# Patient Record
Sex: FEMALE | Race: WHITE | NOT HISPANIC OR LATINO | Employment: UNEMPLOYED | URBAN - METROPOLITAN AREA
[De-identification: names, ages, dates, MRNs, and addresses within clinical notes are randomized per-mention and may not be internally consistent; named-entity substitution may affect disease eponyms.]

---

## 2020-01-07 ENCOUNTER — OFFICE VISIT (OUTPATIENT)
Dept: OPHTHALMOLOGY | Facility: CLINIC | Age: 50
End: 2020-01-07
Payer: COMMERCIAL

## 2020-01-07 DIAGNOSIS — H52.13 MYOPIA OF BOTH EYES: ICD-10-CM

## 2020-01-07 DIAGNOSIS — H52.4 PRESBYOPIA OF BOTH EYES: ICD-10-CM

## 2020-01-07 DIAGNOSIS — Z01.00 EXAMINATION OF EYES AND VISION: Primary | ICD-10-CM

## 2020-01-07 DIAGNOSIS — Z98.890 HISTORY OF STRABISMUS SURGERY: ICD-10-CM

## 2020-01-07 DIAGNOSIS — H52.223 REGULAR ASTIGMATISM OF BOTH EYES: ICD-10-CM

## 2020-01-07 PROCEDURE — 92015 DETERMINE REFRACTIVE STATE: CPT | Performed by: STUDENT IN AN ORGANIZED HEALTH CARE EDUCATION/TRAINING PROGRAM

## 2020-01-07 PROCEDURE — 92004 COMPRE OPH EXAM NEW PT 1/>: CPT | Performed by: STUDENT IN AN ORGANIZED HEALTH CARE EDUCATION/TRAINING PROGRAM

## 2020-01-07 ASSESSMENT — VISUAL ACUITY
CORRECTION_TYPE: GLASSES
OS_CC: J2
OS_CC: 20/25+1
METHOD: SNELLEN - LINEAR
OD_CC: 20/25-2
OD_CC: J2

## 2020-01-07 ASSESSMENT — REFRACTION_WEARINGRX
OS_AXIS: 094
OS_ADD: +1.25
OD_SPHERE: -6.50
OD_ADD: +1.25
OS_CYLINDER: +2.50
SPECS_TYPE: PAL
OS_SPHERE: -7.00
OD_AXIS: 075
OD_CYLINDER: +5.00

## 2020-01-07 ASSESSMENT — CONF VISUAL FIELD
OS_NORMAL: 1
OD_NORMAL: 1

## 2020-01-07 ASSESSMENT — REFRACTION_MANIFEST
OS_ADD: +1.75
OS_SPHERE: -6.75
OD_SPHERE: -5.75
OD_ADD: +1.75
OS_CYLINDER: +2.00
OD_AXIS: 080
OS_AXIS: 097
OD_CYLINDER: +4.25

## 2020-01-07 ASSESSMENT — TONOMETRY
IOP_METHOD: APPLANATION
OD_IOP_MMHG: 16
OS_IOP_MMHG: 17

## 2020-01-07 ASSESSMENT — SLIT LAMP EXAM - LIDS
COMMENTS: NORMAL
COMMENTS: NORMAL

## 2020-01-07 ASSESSMENT — EXTERNAL EXAM - RIGHT EYE: OD_EXAM: NORMAL

## 2020-01-07 ASSESSMENT — EXTERNAL EXAM - LEFT EYE: OS_EXAM: NORMAL

## 2020-01-07 ASSESSMENT — CUP TO DISC RATIO
OD_RATIO: 0.25
OS_RATIO: 0.25

## 2020-01-07 NOTE — PROGRESS NOTES
Current Eye Medications:  no     Subjective:  Comprehensive eye exam.   Pt reports she is not seeing very well with her PAL, particularly for reading. She had strabismus surgery as a child, states she had an eye that turned inwards.     Objective:  See Ophthalmology Exam.       Assessment:  Jessica Sanches is a 49 year old female who presents with:   Encounter Diagnoses   Name Primary?     Examination of eyes and vision      Myopia of both eyes      Regular astigmatism of both eyes      Presbyopia of both eyes        History of strabismus surgery right eye        Plan:  Glasses prescription given    Marcela Orta MD  [phone]

## 2020-01-07 NOTE — LETTER
1/7/2020         RE: Jessica Sanches  [address] [place] [address]        Dear Colleague,    Thank you for referring your patient, Jessica Sanches, to the [facility]. Please see a copy of my visit note below.     Current Eye Medications:  no     Subjective:  Comprehensive eye exam.   Pt reports she is not seeing very well with her PAL, particularly for reading. She had strabismus surgery as a child, states she had an eye that turned inwards.     Objective:  See Ophthalmology Exam.       Assessment:  Jessica Sanches is a 49 year old female who presents with:   Encounter Diagnoses   Name Primary?     Examination of eyes and vision      Myopia of both eyes      Regular astigmatism of both eyes      Presbyopia of both eyes        History of strabismus surgery right eye        Plan:  Glasses prescription given    Marcela Orta MD  [phone]       Again, thank you for allowing me to participate in the care of your patient.        Sincerely,        Marcela Orta MD

## 2021-12-07 ENCOUNTER — OFFICE VISIT (OUTPATIENT)
Dept: OPHTHALMOLOGY | Facility: CLINIC | Age: 51
End: 2021-12-07
Payer: COMMERCIAL

## 2021-12-07 DIAGNOSIS — H52.223 MYOPIA OF BOTH EYES WITH REGULAR ASTIGMATISM AND PRESBYOPIA: ICD-10-CM

## 2021-12-07 DIAGNOSIS — H52.4 MYOPIA OF BOTH EYES WITH REGULAR ASTIGMATISM AND PRESBYOPIA: ICD-10-CM

## 2021-12-07 DIAGNOSIS — Z98.890 HISTORY OF STRABISMUS SURGERY: ICD-10-CM

## 2021-12-07 DIAGNOSIS — Z98.890 S/P LASIK SURGERY: ICD-10-CM

## 2021-12-07 DIAGNOSIS — H52.13 MYOPIA OF BOTH EYES WITH REGULAR ASTIGMATISM AND PRESBYOPIA: ICD-10-CM

## 2021-12-07 DIAGNOSIS — Z01.00 EXAMINATION OF EYES AND VISION: Primary | ICD-10-CM

## 2021-12-07 PROBLEM — E55.9 VITAMIN D DEFICIENCY: Status: ACTIVE | Noted: 2018-11-26

## 2021-12-07 PROBLEM — F32.2 CURRENT SEVERE EPISODE OF MAJOR DEPRESSIVE DISORDER WITHOUT PSYCHOTIC FEATURES WITHOUT PRIOR EPISODE (H): Status: ACTIVE | Noted: 2018-11-23

## 2021-12-07 PROBLEM — D64.9 ANEMIA OF UNKNOWN ETIOLOGY: Status: ACTIVE | Noted: 2018-11-23

## 2021-12-07 PROCEDURE — 92015 DETERMINE REFRACTIVE STATE: CPT | Performed by: STUDENT IN AN ORGANIZED HEALTH CARE EDUCATION/TRAINING PROGRAM

## 2021-12-07 PROCEDURE — 92014 COMPRE OPH EXAM EST PT 1/>: CPT | Performed by: STUDENT IN AN ORGANIZED HEALTH CARE EDUCATION/TRAINING PROGRAM

## 2021-12-07 ASSESSMENT — REFRACTION_MANIFEST
OS_ADD: +2.00
OD_AXIS: 155
OD_ADD: +2.00
OD_CYLINDER: +1.25
OD_SPHERE: -2.00
OS_CYLINDER: +0.75
OS_AXIS: 010
OS_SPHERE: -0.25

## 2021-12-07 ASSESSMENT — CUP TO DISC RATIO
OD_RATIO: 0.25
OS_RATIO: 0.25

## 2021-12-07 ASSESSMENT — VISUAL ACUITY
OD_PH_SC+: -1
OS_SC: 20/20
METHOD: SNELLEN - LINEAR
OD_PH_SC: 20/30
OD_SC: 20/50

## 2021-12-07 ASSESSMENT — TONOMETRY
OS_IOP_MMHG: 14
IOP_METHOD: APPLANATION
OD_IOP_MMHG: 14

## 2021-12-07 ASSESSMENT — EXTERNAL EXAM - RIGHT EYE: OD_EXAM: NORMAL

## 2021-12-07 ASSESSMENT — EXTERNAL EXAM - LEFT EYE: OS_EXAM: NORMAL

## 2021-12-07 ASSESSMENT — SLIT LAMP EXAM - LIDS
COMMENTS: NORMAL
COMMENTS: NORMAL

## 2021-12-07 ASSESSMENT — CONF VISUAL FIELD
OD_NORMAL: 1
OS_NORMAL: 1

## 2021-12-07 NOTE — PROGRESS NOTES
Current Eye Medications: none     Subjective:  Here for complete eye exam today. Had LASIK 7-2020 at the [facility], one eye close (right eye) and one eye for distance. Still having to wear cheaters, unsure of power.     Objective:  See Ophthalmology Exam.      Assessment:  Jessica Sanches is a 50 year old female who presents with:   Encounter Diagnoses   Name Primary?     Examination of eyes and vision      Myopia of both eyes with regular astigmatism and presbyopia        History of strabismus surgery right eye      S/P LASIK surgery - Both Eyes (monovision right eye near)      Stable eye exam.      Plan:  Continue over the counter readers as needed     Marcela Orta MD  [phone]

## 2021-12-07 NOTE — LETTER
12/7/2021         RE: Jessica Sanches  [address] [place] [address]        Dear Colleague,    Thank you for referring your patient, Jessica Sanches, [facility] Please see a copy of my visit note below.     Current Eye Medications: none     Subjective:  Here for complete eye exam today. Had LASIK 7-2020 at the [facility], one eye close (right eye) and one eye for distance. Still having to wear cheaters, unsure of power.     Objective:  See Ophthalmology Exam.      Assessment:  Jessica Sanches is a 50 year old female who presents with:   Encounter Diagnoses   Name Primary?     Examination of eyes and vision      Myopia of both eyes with regular astigmatism and presbyopia        History of strabismus surgery right eye      S/P LASIK surgery - Both Eyes (monovision right eye near)      Stable eye exam.      Plan:  Continue over the counter readers as needed     Marcela Orta MD  [phone]               Again, thank you for allowing me to participate in the care of your patient.        Sincerely,        Marcela Orta MD

## 2022-12-12 ENCOUNTER — OFFICE VISIT (OUTPATIENT)
Dept: OPHTHALMOLOGY | Facility: CLINIC | Age: 52
End: 2022-12-12
Payer: COMMERCIAL

## 2022-12-12 DIAGNOSIS — Z01.01 ENCOUNTER FOR EXAMINATION OF EYES AND VISION WITH ABNORMAL FINDINGS: ICD-10-CM

## 2022-12-12 DIAGNOSIS — Z98.890 HISTORY OF STRABISMUS SURGERY: Primary | ICD-10-CM

## 2022-12-12 DIAGNOSIS — H52.4 MYOPIA OF BOTH EYES WITH REGULAR ASTIGMATISM AND PRESBYOPIA: ICD-10-CM

## 2022-12-12 DIAGNOSIS — H52.223 MYOPIA OF BOTH EYES WITH REGULAR ASTIGMATISM AND PRESBYOPIA: ICD-10-CM

## 2022-12-12 DIAGNOSIS — Z98.890 S/P LASIK SURGERY: ICD-10-CM

## 2022-12-12 DIAGNOSIS — H52.13 MYOPIA OF BOTH EYES WITH REGULAR ASTIGMATISM AND PRESBYOPIA: ICD-10-CM

## 2022-12-12 PROCEDURE — 92015 DETERMINE REFRACTIVE STATE: CPT | Performed by: OPHTHALMOLOGY

## 2022-12-12 PROCEDURE — 92014 COMPRE OPH EXAM EST PT 1/>: CPT | Performed by: OPHTHALMOLOGY

## 2022-12-12 ASSESSMENT — SLIT LAMP EXAM - LIDS
COMMENTS: NORMAL
COMMENTS: NORMAL

## 2022-12-12 ASSESSMENT — VISUAL ACUITY
OD_PH_SC: 20/40
OD_PH_SC+: -1
OD_SC: J3
OS_SC: J16
OS_SC: 20/25
OD_SC+: +1
OD_SC: 20/60
METHOD: SNELLEN - LINEAR

## 2022-12-12 ASSESSMENT — CONF VISUAL FIELD
OD_INFERIOR_TEMPORAL_RESTRICTION: 0
OS_INFERIOR_TEMPORAL_RESTRICTION: 0
OS_SUPERIOR_NASAL_RESTRICTION: 0
OD_INFERIOR_NASAL_RESTRICTION: 0
OS_INFERIOR_NASAL_RESTRICTION: 0
OS_NORMAL: 1
OS_SUPERIOR_TEMPORAL_RESTRICTION: 0
OD_SUPERIOR_TEMPORAL_RESTRICTION: 0
OD_NORMAL: 1
OD_SUPERIOR_NASAL_RESTRICTION: 0

## 2022-12-12 ASSESSMENT — REFRACTION_MANIFEST
OD_SPHERE: -1.00
OS_CYLINDER: +1.25
OS_AXIS: 012
OD_CYLINDER: +0.50
OS_ADD: +2.25
OD_AXIS: 132
OS_SPHERE: +0.25
OD_ADD: +2.25

## 2022-12-12 ASSESSMENT — TONOMETRY
OS_IOP_MMHG: 16
IOP_METHOD: APPLANATION
OD_IOP_MMHG: 15

## 2022-12-12 ASSESSMENT — CUP TO DISC RATIO
OD_RATIO: 0.2
OS_RATIO: 0.3

## 2022-12-12 ASSESSMENT — EXTERNAL EXAM - LEFT EYE: OS_EXAM: NORMAL

## 2022-12-12 ASSESSMENT — EXTERNAL EXAM - RIGHT EYE: OD_EXAM: NORMAL

## 2022-12-12 NOTE — LETTER
"    12/12/2022         RE: Jessica Sanches  [address] [place] [address]        Dear Colleague,    [facility] you [facility], [facility]anches[facility] Please see a copy of my visit note below.     Current Eye Medications:  Artificial tears on a rare occasion.       Subjective:  Comprehensive Eye Exam.  No vision changes or concerns.  She had monovision Lasik ~2019.  Vision is adequate in each eye.  She wears over-the-counter readers when looking at the computer screen.     Objective:  See Ophthalmology Exam.       Assessment:  Stable eye exam.      ICD-10-CM    1. History of strabismus surgery right eye  Z98.890       2. S/P LASIK surgery - Both Eyes (monovision right eye near)  Z98.890       3. Encounter for examination of eyes and vision with abnormal findings  Z01.01       4. Myopia of both eyes with regular astigmatism and presbyopia  H52.13     H52.223     H52.4            Plan:  Glasses prescription given - optional  May use artificial tears up to four times a day (like Refresh Optive, Systane Balance, TheraTears, or generic artificial tears are ok. Avoid \"get the red out\" drops).   Call in August 2023 for an appointment in December 2023 for Complete Exam    Dr. Saucedo [phone]           Again, thank you for allowing me to participate in the care of your patient.        Sincerely,        Kwan Saucedo MD    "

## 2022-12-12 NOTE — PATIENT INSTRUCTIONS
"Glasses prescription given - optional  May use artificial tears up to four times a day (like Refresh Optive, Systane Balance, TheraTears, or generic artificial tears are ok. Avoid \"get the red out\" drops).   Call in August 2023 for an appointment in December 2023 for Complete Exam    Dr. Saucedo [phone]    "

## 2022-12-12 NOTE — PROGRESS NOTES
" Current Eye Medications:  Artificial tears on a rare occasion.       Subjective:  Comprehensive Eye Exam.  No vision changes or concerns.  She had monovision Lasik ~2019.  Vision is adequate in each eye.  She wears over-the-counter readers when looking at the computer screen.     Objective:  See Ophthalmology Exam.       Assessment:  Stable eye exam.      ICD-10-CM    1. History of strabismus surgery right eye  Z98.890       2. S/P LASIK surgery - Both Eyes (monovision right eye near)  Z98.890       3. Encounter for examination of eyes and vision with abnormal findings  Z01.01       4. Myopia of both eyes with regular astigmatism and presbyopia  H52.13     H52.223     H52.4            Plan:  Glasses prescription given - optional  May use artificial tears up to four times a day (like Refresh Optive, Systane Balance, TheraTears, or generic artificial tears are ok. Avoid \"get the red out\" drops).   Call in August 2023 for an appointment in December 2023 for Complete Exam    Dr. Saucedo [phone]       "

## 2022-12-18 PROBLEM — Z98.890 HISTORY OF STRABISMUS SURGERY: Status: ACTIVE | Noted: 2022-12-18

## 2022-12-18 PROBLEM — Z98.890 S/P LASIK SURGERY: Status: ACTIVE | Noted: 2022-12-18

## 2023-12-21 ENCOUNTER — OFFICE VISIT (OUTPATIENT)
Dept: OPHTHALMOLOGY | Facility: CLINIC | Age: 53
End: 2023-12-21
Payer: COMMERCIAL

## 2023-12-21 DIAGNOSIS — Z98.890 HISTORY OF STRABISMUS SURGERY: Primary | ICD-10-CM

## 2023-12-21 DIAGNOSIS — Z98.890 S/P LASIK SURGERY: ICD-10-CM

## 2023-12-21 DIAGNOSIS — H52.4 MYOPIA OF BOTH EYES WITH REGULAR ASTIGMATISM AND PRESBYOPIA: ICD-10-CM

## 2023-12-21 DIAGNOSIS — H52.223 MYOPIA OF BOTH EYES WITH REGULAR ASTIGMATISM AND PRESBYOPIA: ICD-10-CM

## 2023-12-21 DIAGNOSIS — Z01.01 ENCOUNTER FOR EXAMINATION OF EYES AND VISION WITH ABNORMAL FINDINGS: ICD-10-CM

## 2023-12-21 DIAGNOSIS — H52.13 MYOPIA OF BOTH EYES WITH REGULAR ASTIGMATISM AND PRESBYOPIA: ICD-10-CM

## 2023-12-21 PROCEDURE — 92015 DETERMINE REFRACTIVE STATE: CPT | Performed by: OPHTHALMOLOGY

## 2023-12-21 PROCEDURE — 92014 COMPRE OPH EXAM EST PT 1/>: CPT | Performed by: OPHTHALMOLOGY

## 2023-12-21 RX ORDER — VENLAFAXINE HYDROCHLORIDE 37.5 MG/1
37.5 CAPSULE, EXTENDED RELEASE ORAL DAILY
COMMUNITY
Start: 2023-07-24

## 2023-12-21 ASSESSMENT — REFRACTION_WEARINGRX
OD_CYLINDER: SPHERE
OD_ADD: +2.50
OD_SPHERE: +1.25
OD_SPHERE: PLANO
OS_CYLINDER: SPHERE
OD_CYLINDER: SPHERE
OS_SPHERE: +1.25
OS_CYLINDER: SPHERE
OS_SPHERE: PLANO
OS_ADD: +2.50

## 2023-12-21 ASSESSMENT — VISUAL ACUITY
OS_SC: 20/25
OD_SC: J1
OS_SC: J16
OD_PH_SC: 20/30
METHOD: SNELLEN - LINEAR
OD_SC: 20/50
OD_PH_SC+: +2
OS_SC+: +2

## 2023-12-21 ASSESSMENT — TONOMETRY
IOP_METHOD: APPLANATION
OD_IOP_MMHG: 16
OS_IOP_MMHG: 15

## 2023-12-21 ASSESSMENT — REFRACTION_MANIFEST
OS_ADD: +2.25
OS_CYLINDER: +1.25
OS_SPHERE: +0.25
OS_SPHERE: +1.50
OD_ADD: +2.25
OS_AXIS: 013
OS_ADD: +1.00
OD_AXIS: 160
OD_SPHERE: -1.00
OD_ADD: +1.00
OS_CYLINDER: +1.25
OS_AXIS: 013
OD_CYLINDER: +1.25
OD_CYLINDER: +1.25
OD_AXIS: 160
OD_SPHERE: -2.25

## 2023-12-21 ASSESSMENT — CONF VISUAL FIELD
OD_INFERIOR_NASAL_RESTRICTION: 0
OS_SUPERIOR_TEMPORAL_RESTRICTION: 0
OD_SUPERIOR_TEMPORAL_RESTRICTION: 0
OS_INFERIOR_NASAL_RESTRICTION: 0
OS_NORMAL: 1
OD_NORMAL: 1
OS_INFERIOR_TEMPORAL_RESTRICTION: 0
METHOD: COUNTING FINGERS
OD_SUPERIOR_NASAL_RESTRICTION: 0
OD_INFERIOR_TEMPORAL_RESTRICTION: 0
OS_SUPERIOR_NASAL_RESTRICTION: 0

## 2023-12-21 ASSESSMENT — SLIT LAMP EXAM - LIDS
COMMENTS: NORMAL
COMMENTS: NORMAL

## 2023-12-21 ASSESSMENT — EXTERNAL EXAM - RIGHT EYE: OD_EXAM: NORMAL

## 2023-12-21 ASSESSMENT — CUP TO DISC RATIO
OS_RATIO: 0.3
OD_RATIO: 0.2

## 2023-12-21 ASSESSMENT — EXTERNAL EXAM - LEFT EYE: OS_EXAM: NORMAL

## 2023-12-21 NOTE — LETTER
"    12/21/2023         RE: Jessica Sanches  [address] [place] [address]        Dear Colleague,    [facility] you [facility], Jessica Sanches[facility] Please see a copy of my visit note below.     Current Eye Medications:  none     Subjective:  comprehensive eye exam - noticing more trouble with using the computer, has OTC progressive gls (+2.50 add) but finds that her neck hurts from tilting head back. Got SVL OTC readers (+1.50), good for computer but does not like that she has to take them off and on to look at different ranges like walking or doing other activities. Hx Monovision LASIK. Distance vision is stable without correction.      Objective:  See Ophthalmology Exam.       Assessment:  Stable eye exam.      ICD-10-CM    1. History of strabismus surgery right eye  Z98.890       2. S/P LASIK surgery - Both Eyes (monovision right eye near)  Z98.890       3. Encounter for examination of eyes and vision with abnormal findings  Z01.01       4. Myopia of both eyes with regular astigmatism and presbyopia  H52.13     H52.223     H52.4            Plan:  Glasses prescription given - optional - recommend try progressive add and try to sit ergometrically so that downgaze more easily.    May use artificial tears up to four times a day (like Refresh Optive, Systane Balance, or TheraTears. Avoid \"get the red out\" drops and generic artifical tears).     Call in August 2024 for an appointment in December 2024 for Complete Exam    Dr. Saucedo [phone]           Again, thank you for allowing me to participate in the care of your patient.        Sincerely,        Kwan Saucedo MD  "

## 2023-12-21 NOTE — PATIENT INSTRUCTIONS
"Glasses prescription given - optional    May use artificial tears up to four times a day (like Refresh Optive, Systane Balance, or TheraTears. Avoid \"get the red out\" drops and generic artifical tears).     Call in August 2024 for an appointment in December 2024 for Complete Exam    Dr. Saucedo [phone]    "

## 2023-12-21 NOTE — PROGRESS NOTES
" Current Eye Medications:  none     Subjective:  comprehensive eye exam - noticing more trouble with using the computer, has OTC progressive gls (+2.50 add) but finds that her neck hurts from tilting head back. Got SVL OTC readers (+1.50), good for computer but does not like that she has to take them off and on to look at different ranges like walking or doing other activities. Hx Monovision LASIK. Distance vision is stable without correction.      Objective:  See Ophthalmology Exam.       Assessment:  Stable eye exam.      ICD-10-CM    1. History of strabismus surgery right eye  Z98.890       2. S/P LASIK surgery - Both Eyes (monovision right eye near)  Z98.890       3. Encounter for examination of eyes and vision with abnormal findings  Z01.01       4. Myopia of both eyes with regular astigmatism and presbyopia  H52.13     H52.223     H52.4            Plan:  Glasses prescription given - optional - recommend try progressive add and try to sit ergometrically so that downgaze more easily.    May use artificial tears up to four times a day (like Refresh Optive, Systane Balance, or TheraTears. Avoid \"get the red out\" drops and generic artifical tears).     Call in August 2024 for an appointment in December 2024 for Complete Exam    Dr. Saucedo [phone]       "

## 2024-01-21 ENCOUNTER — HEALTH MAINTENANCE LETTER (OUTPATIENT)
Age: 54
End: 2024-01-21

## 2024-07-08 ENCOUNTER — TRANSFERRED RECORDS (OUTPATIENT)
Dept: MULTI SPECIALTY CLINIC | Facility: CLINIC | Age: 54
End: 2024-07-08

## 2024-07-08 LAB — PAP SMEAR - HIM PATIENT REPORTED: NORMAL

## 2024-10-28 ENCOUNTER — OFFICE VISIT (OUTPATIENT)
Dept: ORTHOPEDICS | Facility: CLINIC | Age: 54
End: 2024-10-28
Payer: COMMERCIAL

## 2024-10-28 ENCOUNTER — ANCILLARY PROCEDURE (OUTPATIENT)
Dept: GENERAL RADIOLOGY | Facility: CLINIC | Age: 54
End: 2024-10-28
Attending: PEDIATRICS
Payer: COMMERCIAL

## 2024-10-28 DIAGNOSIS — M25.531 RIGHT WRIST PAIN: Primary | ICD-10-CM

## 2024-10-28 DIAGNOSIS — M25.531 RIGHT WRIST PAIN: ICD-10-CM

## 2024-10-28 DIAGNOSIS — M25.431 PAIN AND SWELLING OF RIGHT WRIST: ICD-10-CM

## 2024-10-28 DIAGNOSIS — M25.531 PAIN AND SWELLING OF RIGHT WRIST: ICD-10-CM

## 2024-10-28 DIAGNOSIS — M65.4 RADIAL STYLOID TENOSYNOVITIS: ICD-10-CM

## 2024-10-28 PROCEDURE — G2211 COMPLEX E/M VISIT ADD ON: HCPCS | Performed by: PEDIATRICS

## 2024-10-28 PROCEDURE — 73110 X-RAY EXAM OF WRIST: CPT | Mod: TC | Performed by: STUDENT IN AN ORGANIZED HEALTH CARE EDUCATION/TRAINING PROGRAM

## 2024-10-28 PROCEDURE — 99204 OFFICE O/P NEW MOD 45 MIN: CPT | Performed by: PEDIATRICS

## 2024-10-28 NOTE — PROGRESS NOTES
ASSESSMENT & PLAN    Jessica was seen today for pain.    Diagnoses and all orders for this visit:    Right wrist pain  -     XR Wrist Right G/E 3 Views; Future  -     Wrist/Arm/Hand Bracing Supplies Order Wrist Brace; Right; with thumb spica    Pain and swelling of right wrist  -     Wrist/Arm/Hand Bracing Supplies Order Wrist Brace; Right; with thumb spica    Radial styloid tenosynovitis      This issue is chronic and Unchanged.      ICD-10-CM    1. Right wrist pain  M25.531 XR Wrist Right G/E 3 Views     Wrist/Arm/Hand Bracing Supplies Order Wrist Brace; Right; with thumb spica      2. Pain and swelling of right wrist  M25.531 Wrist/Arm/Hand Bracing Supplies Order Wrist Brace; Right; with thumb spica    M25.431       3. Radial styloid tenosynovitis  M65.4           Differential included radial styloid tenosynovitis, possible ganglion cyst as well.    We discussed the following treatment options: symptom treatment, activity modification/rest, imaging, rehab and referral. Following discussion, plan: will start with bracing.    Plan:  - Today's Plan of Care:  Thumb spica bracing    Discussed activity considerations and other supportive care including Ice/Heat, OTC and other topical medications as needed.    -We also discussed other future treatment options:  Referral to Occupational therapy  US guided procedure (injection v. Ganglion cyst aspiration)  MRI if not improving    Follow Up: 3-6 weeks  Can continue to follow up with me for further treatment recommendations      Concerning signs and symptoms were reviewed and all questions were answered at this time.    Edith Bangura MD [facility]  Sports Medicine Physician  [facility]    -----  Chief Complaint   Patient presents with    Right Wrist - Pain       SUBJECTIVE  Jessica Sanches is a/an 53 year old female who is seen as a self referral for evaluation of right wrist.     The patient is seen by themselves.  The patient is Right handed    Onset: 10  month(s) ago. Reports insidious onset without acute precipitating event.  Location of Pain: right wrist; distal radius  Worsened by: grabbing, pulling, mousing, computer   Better with: icing, heating, voltaren   Treatments tried: ice, heat, casting/splinting/bracing, and compression sleeve and immobilize   Associated symptoms: swelling, weakness of right wrist, and bump noted     Orthopedic/Surgical history: NO  Social History/Occupation: working at a desk       REVIEW OF SYSTEMS:  Review of Systems    OBJECTIVE:  There were no vitals taken for this visit.   General: healthy, alert and in no distress  Skin: no suspicious lesions or rash.  CV: distal perfusion intact   Resp: normal respiratory effort without conversational dyspnea   Psych: normal mood and affect  Gait: NORMAL  Neuro: Normal light sensory exam of upper extremity    Right Wrist and Hand exam    Inspection:       Swelling: right radial wrist    Tender:       Radial styloid right wrist    Non Tender:       Remainder of the Wrist and Hand bilateral    ROM:       Full and symmetric active and passive range of motion of the forearm, wrist and digits bilateral    Strength:       5/5 strength in the muscles of the hand, wrist and forearm right    Special Tests:        positive (+) Finkelstein's maneuver right - mildly    Neurovascular:       2+ radial pulses bilaterally with brisk capillary refill and      normal sensation to light touch in the radial, median and ulnar nerve distributions     RADIOLOGY:  Final results and radiologist's interpretation, available in the [facility] health record.  Images were reviewed with the patient in the office today.  My personal interpretation of the performed imaging:    3 XR views of right wrist reviewed: no acute bony abnormality, no significant degenerative change  - will follow official read      Review of the result(s) of each unique test - XR

## 2024-10-28 NOTE — PATIENT INSTRUCTIONS
Differential included radial styloid tenosynovitis, possible ganglion cyst as well.    We discussed the following treatment options: symptom treatment, activity modification/rest, imaging, rehab and referral. Following discussion, plan: will start with bracing.    Plan:  - Today's Plan of Care:  Thumb spica bracing    Discussed activity considerations and other supportive care including Ice/Heat, OTC and other topical medications as needed.    -We also discussed other future treatment options:  Referral to Occupational therapy  US guided procedure (injection v. Ganglion cyst aspiration)  MRI if not improving    Follow Up: 3-6 weeks    If you have any further questions for your physician or physician s care team you can call [phone].

## 2024-12-09 ENCOUNTER — TRANSCRIBE ORDERS (OUTPATIENT)
Dept: OTHER | Age: 54
End: 2024-12-09

## 2024-12-09 DIAGNOSIS — R79.89 ABNORMAL THYROID BLOOD TEST: Primary | ICD-10-CM

## 2024-12-09 DIAGNOSIS — Z83.49 FAMILY HISTORY OF HASHIMOTO THYROIDITIS: ICD-10-CM

## 2024-12-19 ENCOUNTER — OFFICE VISIT (OUTPATIENT)
Dept: ORTHOPEDICS | Facility: CLINIC | Age: 54
End: 2024-12-19
Attending: PEDIATRICS
Payer: COMMERCIAL

## 2024-12-19 DIAGNOSIS — M65.4 RADIAL STYLOID TENOSYNOVITIS: Primary | ICD-10-CM

## 2024-12-19 DIAGNOSIS — M25.531 RIGHT WRIST PAIN: ICD-10-CM

## 2024-12-19 RX ORDER — BETAMETHASONE SODIUM PHOSPHATE AND BETAMETHASONE ACETATE 3; 3 MG/ML; MG/ML
6 INJECTION, SUSPENSION INTRA-ARTICULAR; INTRALESIONAL; INTRAMUSCULAR; SOFT TISSUE
Status: COMPLETED | OUTPATIENT
Start: 2024-12-19 | End: 2024-12-19

## 2024-12-19 RX ORDER — ROPIVACAINE HYDROCHLORIDE 5 MG/ML
1 INJECTION, SOLUTION EPIDURAL; INFILTRATION; PERINEURAL
Status: COMPLETED | OUTPATIENT
Start: 2024-12-19 | End: 2024-12-19

## 2024-12-19 RX ADMIN — BETAMETHASONE SODIUM PHOSPHATE AND BETAMETHASONE ACETATE 6 MG: 3; 3 INJECTION, SUSPENSION INTRA-ARTICULAR; INTRALESIONAL; INTRAMUSCULAR; SOFT TISSUE at 14:31

## 2024-12-19 RX ADMIN — ROPIVACAINE HYDROCHLORIDE 1 ML: 5 INJECTION, SOLUTION EPIDURAL; INFILTRATION; PERINEURAL at 14:31

## 2024-12-19 NOTE — PATIENT INSTRUCTIONS
[facility] Injection Discharge Instructions    Procedure: Right De Quervain's Tenosynovitis Steroid Injection    Follow-up: 1 mon as needed    You may shower, however avoid swimming, tub baths or hot tubs for 24 hours following your procedure  You may have a mild to moderate increase in pain for several days following the injection.  It may take up to 14 days for the steroid medication to start working although you may feel the effect as early as a few days after the procedure.  You may use ice packs for 10-15 minutes, 3 to 4 times a day at the injection site for comfort  You may use anti-inflammatory medications (such as Ibuprofen or Aleve or Advil) or Tylenol for pain control if necessary  If you were fasting, you may resume your normal diet and medications after the procedure  If you have diabetes, check your blood sugar more frequently than usual as your blood sugar may be higher than normal for 10-14 days following a steroid injection. Contact your doctor who manages your diabetes if your blood sugar is higher than usual    If you experience any of the following, call [facility] @ [phone] or [phone]  -Fever over 100 degree F  -Swelling, bleeding, redness, drainage, warmth at the injection site  - New or worsening pain

## 2024-12-19 NOTE — PROGRESS NOTES
Hand / Upper Extremity Injection/Arthrocentesis: R extensor compartment 1    Date/Time: 12/19/2024 2:31 PM    Performed by: Brendon Kincaid MD  Authorized by: Brendon Kincaid MD    Indications:  Pain and therapeutic  Needle Size:  25 G  Guidance: ultrasound    Approach:  Radial  Condition: de Quervain's      Site:  R extensor compartment 1  Medications:  6 mg betamethasone acet & sod phos 6 (3-3) MG/ML; 1 mL ROPivacaine 5 MG/ML  Outcome:  Tolerated well, no immediate complications  Procedure discussed: discussed risks, benefits, and alternatives    Consent Given by:  Patient  Timeout: timeout called immediately prior to procedure    Prep: patient was prepped and draped in usual sterile fashion     Ultrasound images of procedure were permanently stored.   Referred by Dr. Bangura     Patient reported some improvement of pain after the numbing portion right 1st dorsal compartment steroid injection.  Ultrasound guided images were permanently stored.  Aftercare instructions given to patient.  Plan to follow-up as previously discussed with referring provider.     Brendon Kincaid MD [facility]      I was present with the resident during the history and exam.  I discussed the case with the resident and agree with the findings as documented in the assessment and plan.

## 2024-12-19 NOTE — LETTER
12/19/2024      Jessica Sanches  [address] [place] [address]      Dear Colleague,    Thank you for referring your patient, Jessica Snaches, to the [facility] [place]. Please see a copy of my visit note below.    Hand / Upper Extremity Injection/Arthrocentesis: R extensor compartment 1    Date/Time: 12/19/2024 2:31 PM    Performed by: Brendon Kincaid MD  Authorized by: Brendon Kincaid MD    Indications:  Pain and therapeutic  Needle Size:  25 G  Guidance: ultrasound    Approach:  Radial  Condition: de Quervain's      Site:  R extensor compartment 1  Medications:  6 mg betamethasone acet & sod phos 6 (3-3) MG/ML; 1 mL ROPivacaine 5 MG/ML  Outcome:  Tolerated well, no immediate complications  Procedure discussed: discussed risks, benefits, and alternatives    Consent Given by:  Patient  Timeout: timeout called immediately prior to procedure    Prep: patient was prepped and draped in usual sterile fashion     Ultrasound images of procedure were permanently stored.   Referred by Dr. Bangura     Patient reported some improvement of pain after the numbing portion right 1st dorsal compartment steroid injection.  Ultrasound guided images were permanently stored.  Aftercare instructions given to patient.  Plan to follow-up as previously discussed with referring provider.     Brendon Kincaid MD [facility]            Again, thank you for allowing me to participate in the care of your patient.        Sincerely,        Brendon Kincaid MD

## 2025-02-01 ENCOUNTER — HEALTH MAINTENANCE LETTER (OUTPATIENT)
Age: 55
End: 2025-02-01

## 2025-04-30 ENCOUNTER — HOSPITAL ENCOUNTER (OUTPATIENT)
Dept: MRI IMAGING | Facility: CLINIC | Age: 55
Discharge: HOME | End: 2025-04-30
Attending: PEDIATRICS
Payer: COMMERCIAL

## 2025-04-30 DIAGNOSIS — M65.4 RADIAL STYLOID TENOSYNOVITIS: ICD-10-CM

## 2025-04-30 PROCEDURE — 73221 MRI JOINT UPR EXTREM W/O DYE: CPT | Mod: RT

## 2025-04-30 PROCEDURE — 73221 MRI JOINT UPR EXTREM W/O DYE: CPT | Mod: 26 | Performed by: RADIOLOGY

## 2025-05-05 ENCOUNTER — PATIENT OUTREACH (OUTPATIENT)
Dept: CARE COORDINATION | Facility: CLINIC | Age: 55
End: 2025-05-05
Payer: COMMERCIAL

## 2025-05-07 ENCOUNTER — PATIENT OUTREACH (OUTPATIENT)
Dept: CARE COORDINATION | Facility: CLINIC | Age: 55
End: 2025-05-07
Payer: COMMERCIAL

## 2025-05-09 PROBLEM — M25.531 RIGHT WRIST PAIN: Status: ACTIVE | Noted: 2025-05-09

## 2025-05-09 PROBLEM — M65.4 RADIAL STYLOID TENOSYNOVITIS: Status: ACTIVE | Noted: 2025-05-09

## 2025-05-12 NOTE — TELEPHONE ENCOUNTER
Action May 12, 2025 11:01 AM MT   Action Taken Sent a request for imaging from [facility].     DIAGNOSIS:   Radial styloid tenosynovitis [M65.4]  - Primary  Right wrist pain [M25.531]   APPOINTMENT DATE: 05/20/2025   NOTES STATUS DETAILS   OFFICE NOTE from referring provider Internal 05/02/2025 - Edith Bangura MD  Sports Medicine   OFFICE NOTE from other specialist Internal 05/09/2025 - [facility]   MRI PACS Internal   ULTRASOUND IN PROCESS [facility]:  12/13/2024 - RT Axillary     XRAYS (IMAGES & REPORTS) PACS Internal

## 2025-05-21 ENCOUNTER — OFFICE VISIT (OUTPATIENT)
Dept: ORTHOPEDICS | Facility: CLINIC | Age: 55
End: 2025-05-21
Payer: COMMERCIAL

## 2025-05-21 ENCOUNTER — PRE VISIT (OUTPATIENT)
Dept: ORTHOPEDICS | Facility: CLINIC | Age: 55
End: 2025-05-21

## 2025-05-21 DIAGNOSIS — M25.531 PAIN AND SWELLING OF RIGHT WRIST: ICD-10-CM

## 2025-05-21 DIAGNOSIS — M65.4 RADIAL STYLOID TENOSYNOVITIS: ICD-10-CM

## 2025-05-21 DIAGNOSIS — M25.431 PAIN AND SWELLING OF RIGHT WRIST: ICD-10-CM

## 2025-05-21 DIAGNOSIS — M25.531 RIGHT WRIST PAIN: ICD-10-CM

## 2025-05-21 PROCEDURE — 99203 OFFICE O/P NEW LOW 30 MIN: CPT | Mod: GC | Performed by: ORTHOPAEDIC SURGERY

## 2025-05-21 NOTE — LETTER
5/21/2025      Jessica Sanches  [address] [place] [address]      Dear Colleague,    Thank you for referring your patient, Jessica Sanches, to the [facility] [place]. Please see a copy of my visit note below.    Orthopaedic Surgery Consultation  5/21/2025 9:07 AM   by Yo Bateman MD    Jessica Sanches MRN# [id number]   Age: 54 year old YOB: 1970     Reason for consult: Right DeQuervain's Tenosynovitis                       Assessment and Plan:   Assessment:   Jessica is a 55yo RHD Female with right 1st extensor compartment tenosynovitis.       Plan:   We discussed radiographic and clinical findings. It seems that Jessica is making progress with hand therapy.  She had decent improvement with corticosteroid injection.  She would like to continue working with hand therapy at this time.  We did discuss surgical release and decompression of the right first extensor compartment.  Would recommend that she follows up in 5 to 6 weeks to reassess her symptoms.  If she is doing well with hand therapy, she can cancel the appointment.  However, if she continues to have symptoms, would be reasonable to proceed with surgical management at that time.  She understands and agrees with the plan.              History of Present Illness:   54 year old female right-hand-dominant presents for evaluation of right wrist pain.  She was diagnosed with right de Quervain's tenosynovitis and was given a splint.  She reports that the splint does help with her symptoms.  She also received a corticosteroid injection in December 2021 which provided her 2 months of relief.  She noticed the bump over the radial styloid region.  She continues to have some symptoms with prolonged and extensive activities.  She denies any numbness or tingling.  Denies pain anywhere else.  Denies any other concerns.           Past Medical History:     Patient Active Problem List   Diagnosis     Vitamin D  deficiency     Insomnia, unspecified     Disorder of thyroid     Current severe episode of major depressive disorder without psychotic features without prior episode (H)     Anemia of unknown etiology     History of strabismus surgery right eye     S/P LASIK surgery - Both Eyes (monovision right eye near)     Radial styloid tenosynovitis     Right wrist pain     Past Medical History:   Diagnosis Date     Amblyopia      Strabismus             Past Surgical History:   Relevant surgical history as mentioned in HPI.  Past Surgical History:   Procedure Laterality Date     LASIK       STRABISMUS SURGERY              Social History:     Social History     Socioeconomic History     Marital status:      Spouse name: Not on file     Number of children: Not on file     Years of education: Not on file     Highest education level: Not on file   Occupational History     Not on file   Tobacco Use     Smoking status: Never     Smokeless tobacco: Never   Substance and Sexual Activity     Alcohol use: Not on file     Drug use: Not on file     Sexual activity: Not on file   Other Topics Concern     Not on file   Social History Narrative     Not on file     Social Drivers of Health     Financial Resource Strain: Low Risk  (6/13/2023)    Received from [facility]    Financial Resource Strain      Difficulty of Paying Living Expenses: 3      Difficulty of Paying Living Expenses: Not on file   Food Insecurity: No Food Insecurity (6/13/2023)    Received from [facility]    Food Insecurity      Worried About Running Out of Food in the Last Year: 1   Transportation Needs: No Transportation Needs (6/13/2023)    Received from [facility]    Transportation Needs      Lack of Transportation (Medical): 1   Physical Activity: Not on file   Stress: Not on file   Social Connections: Unknown (6/17/2024)    Received from [facility]    Social Connections      Frequency of Communication with Friends and Family: Not on file   Interpersonal Safety: Not on file   Housing Stability: Low Risk  (6/13/2023)    Received from [facility]    Housing Stability      Unable to Pay for Housing in the Last Year: 1     Smoking, EtOH,        Family History:     Family History   Problem Relation Age of Onset     Hypertension Mother      Cancer Mother         Breast     Hypertension Father      Diabetes No family hx of      Glaucoma No family hx of      Strabismus No family hx of      Eye Surgery No family hx of      No history of problems with bleeding or anesthesia       Allergies:   No Known Allergies       Medications:     Current Outpatient Medications   Medication Sig Dispense Refill     venlafaxine (EFFEXOR XR) 37.5 MG 24 hr capsule Take 37.5 mg by mouth daily       No current facility-administered medications for this visit.             Review of Systems:   A comprehensive 10 point review of systems (constitutional, ENT, cardiac, peripheral vascular, lymphatic, respiratory, GI, , Musculoskeletal, skin, Neurological) was performed and found to be negative except as described in this note.           Physical Exam:     EXAMINATION pertinent findings:   VITAL SIGNS: There were no vitals taken for this visit.  There is no height or weight on file to calculate BMI.  RESP: non labored breathing   CARD: comfortable, no acute distress  ABD: benign   Right hand exam  - There is calcification and inflammation over the right first extensor compartment  - Some pain with thumb extension.  Minimal pain with Finkelstein test.  - Tenderness palpation over the right snuffbox and radial styloid region  - No motor or sensory deficits noted.  - Radial pulse palpable  - Fingers warm and well perfused  - Brisk capillary refill              Data:     All laboratory data reviewed  All imaging studies reviewed by  me.  Pertinent Imaging and Lab Review:   Reviewed right wrist x-rays obtained on 10/28/2024 and right wrist MRI obtained on 4/30/2025.  There is evidence of inflammation and tenosynovitis of the first extensor compartment mainly located over the radial styloid region.  No other acute osseous abnormalities.    --  Rufus Calixto MD  Orthopedic Surgery PGY-4      Total Time = 20 min, 50% of which was spent in counseling and coordination of care as documented above.    Yo Bateman MD  Orthopedic Surgery, Upper Extremity  Cell [phone]         Attestation signed by Yo Bateman MD at 5/21/2025  4:07 PM:  Patient was seen and examined with the resident.  I agree with the assessment and plan of care.      Again, thank you for allowing me to participate in the care of your patient.        Sincerely,        Yo Bateman MD    Electronically signed

## 2025-05-21 NOTE — PROGRESS NOTES
Orthopaedic Surgery Consultation  5/21/2025 9:07 AM   by Yo Bateman MD    Jessica Sanches MRN# [id number]   Age: 54 year old YOB: 1970     Reason for consult: Right DeQuervain's Tenosynovitis                       Assessment and Plan:   Assessment:   Jessica is a 55yo RHD Female with right 1st extensor compartment tenosynovitis.       Plan:   We discussed radiographic and clinical findings. It seems that Jessica is making progress with hand therapy.  She had decent improvement with corticosteroid injection.  She would like to continue working with hand therapy at this time.  We did discuss surgical release and decompression of the right first extensor compartment.  Would recommend that she follows up in 5 to 6 weeks to reassess her symptoms.  If she is doing well with hand therapy, she can cancel the appointment.  However, if she continues to have symptoms, would be reasonable to proceed with surgical management at that time.  She understands and agrees with the plan.              History of Present Illness:   54 year old female right-hand-dominant presents for evaluation of right wrist pain.  She was diagnosed with right de Quervain's tenosynovitis and was given a splint.  She reports that the splint does help with her symptoms.  She also received a corticosteroid injection in December 2021 which provided her 2 months of relief.  She noticed the bump over the radial styloid region.  She continues to have some symptoms with prolonged and extensive activities.  She denies any numbness or tingling.  Denies pain anywhere else.  Denies any other concerns.           Past Medical History:     Patient Active Problem List   Diagnosis    Vitamin D deficiency    Insomnia, unspecified    Disorder of thyroid    Current severe episode of major depressive disorder without psychotic features without prior episode (H)    Anemia of unknown etiology    History of strabismus surgery right eye    S/P LASIK surgery  - Both Eyes (monovision right eye near)    Radial styloid tenosynovitis    Right wrist pain     Past Medical History:   Diagnosis Date    Amblyopia     Strabismus             Past Surgical History:   Relevant surgical history as mentioned in HPI.  Past Surgical History:   Procedure Laterality Date    LASIK      STRABISMUS SURGERY              Social History:     Social History     Socioeconomic History    Marital status:      Spouse name: Not on file    Number of children: Not on file    Years of education: Not on file    Highest education level: Not on file   Occupational History    Not on file   Tobacco Use    Smoking status: Never    Smokeless tobacco: Never   Substance and Sexual Activity    Alcohol use: Not on file    Drug use: Not on file    Sexual activity: Not on file   Other Topics Concern    Not on file   Social History Narrative    Not on file     Social Drivers of Health     Financial Resource Strain: Low Risk  (6/13/2023)    Received from [facility]    Financial Resource Strain     Difficulty of Paying Living Expenses: 3     Difficulty of Paying Living Expenses: Not on file   Food Insecurity: No Food Insecurity (6/13/2023)    Received from [facility]    Food Insecurity     Worried About Running Out of Food in the Last Year: 1   Transportation Needs: No Transportation Needs (6/13/2023)    Received from [facility]    Transportation Needs     Lack of Transportation (Medical): 1   Physical Activity: Not on file   Stress: Not on file   Social Connections: Unknown (6/17/2024)    Received from [facility]    Social Connections     Frequency of Communication with Friends and Family: Not on file   Interpersonal Safety: Not on file   Housing Stability: Low Risk  (6/13/2023)    Received from [facility]    Housing Stability     Unable to Pay  for Housing in the Last Year: 1     Smoking, EtOH,        Family History:     Family History   Problem Relation Age of Onset    Hypertension Mother     Cancer Mother         Breast    Hypertension Father     Diabetes No family hx of     Glaucoma No family hx of     Strabismus No family hx of     Eye Surgery No family hx of      No history of problems with bleeding or anesthesia       Allergies:   No Known Allergies       Medications:     Current Outpatient Medications   Medication Sig Dispense Refill    venlafaxine (EFFEXOR XR) 37.5 MG 24 hr capsule Take 37.5 mg by mouth daily       No current facility-administered medications for this visit.             Review of Systems:   A comprehensive 10 point review of systems (constitutional, ENT, cardiac, peripheral vascular, lymphatic, respiratory, GI, , Musculoskeletal, skin, Neurological) was performed and found to be negative except as described in this note.           Physical Exam:     EXAMINATION pertinent findings:   VITAL SIGNS: There were no vitals taken for this visit.  There is no height or weight on file to calculate BMI.  RESP: non labored breathing   CARD: comfortable, no acute distress  ABD: benign   Right hand exam  - There is calcification and inflammation over the right first extensor compartment  - Some pain with thumb extension.  Minimal pain with Finkelstein test.  - Tenderness palpation over the right snuffbox and radial styloid region  - No motor or sensory deficits noted.  - Radial pulse palpable  - Fingers warm and well perfused  - Brisk capillary refill              Data:     All laboratory data reviewed  All imaging studies reviewed by me.  Pertinent Imaging and Lab Review:   Reviewed right wrist x-rays obtained on 10/28/2024 and right wrist MRI obtained on 4/30/2025.  There is evidence of inflammation and tenosynovitis of the first extensor compartment mainly located over the radial styloid region.  No other acute osseous  abnormalities.    --  Rufus Calixto MD  Orthopedic Surgery PGY-4      Total Time = 20 min, 50% of which was spent in counseling and coordination of care as documented above.    Yo Bateman MD  Orthopedic Surgery, Upper Extremity  Cell [phone]

## 2025-05-21 NOTE — NURSING NOTE
Reason For Visit:   Chief Complaint   Patient presents with    Consult     Right wrist tenosynovitis        Primary MD: Kymberly Eastman  Ref. MD: Edith Bangura    Age: 54 year old    ?  No      There were no vitals taken for this visit.      Pain Assessment  Patient Currently in Pain: Yes  Patient's Stated Pain Goal: 2 (2/10 with use)  Primary Pain Location: Wrist (Right)  Pain Descriptors: Intermittent, Aching    Hand Dominance Evaluation  Hand Dominance: Right          QuickDASH Assessment      5/20/2025     6:59 PM   QuickDASH Main   1. Open a tight or new jar Moderate difficulty   2. Do heavy household chores (e.g., wash walls, floors) Moderate difficulty   3. Carry a shopping bag or briefcase Moderate difficulty   4. Wash your back Moderate difficulty   5. Use a knife to cut food Mild difficulty   6. Recreational activities in which you take some force or impact through your arm, shoulder or hand (e.g., golf, hammering, tennis, etc.) Mild difficulty   7. During the past week, to what extent has your arm, shoulder or hand problem interfered with your normal social activities with family, friends, neighbours or groups Moderately   8. During the past week, were you limited in your work or other regular daily activities as a result of your arm, shoulder or hand problem Moderately limited   9. Arm, shoulder or hand pain Mild   10.Tingling (pins and needles) in your arm,shoulder or hand None   11. During the past week, how much difficulty have you had sleeping because of the pain in your arm, shoulder or hand Mild difficulty   Quickdash Ability Score 36.36          Current Outpatient Medications   Medication Sig Dispense Refill    venlafaxine (EFFEXOR XR) 37.5 MG 24 hr capsule Take 37.5 mg by mouth daily         No Known Allergies    CHELSI CHING, ATC

## 2025-06-11 ENCOUNTER — THERAPY VISIT (OUTPATIENT)
Dept: OCCUPATIONAL THERAPY | Facility: CLINIC | Age: 55
End: 2025-06-11
Payer: COMMERCIAL

## 2025-06-11 DIAGNOSIS — M25.531 RIGHT WRIST PAIN: Primary | ICD-10-CM

## 2025-06-11 PROCEDURE — 97140 MANUAL THERAPY 1/> REGIONS: CPT | Mod: GO

## 2025-06-11 PROCEDURE — 97110 THERAPEUTIC EXERCISES: CPT | Mod: GO

## 2025-06-11 PROCEDURE — 97035 APP MDLTY 1+ULTRASOUND EA 15: CPT | Mod: GO

## 2025-06-26 ENCOUNTER — THERAPY VISIT (OUTPATIENT)
Dept: OCCUPATIONAL THERAPY | Facility: CLINIC | Age: 55
End: 2025-06-26
Payer: COMMERCIAL

## 2025-06-26 DIAGNOSIS — M25.531 RIGHT WRIST PAIN: Primary | ICD-10-CM

## 2025-06-30 ENCOUNTER — OFFICE VISIT (OUTPATIENT)
Dept: ORTHOPEDICS | Facility: CLINIC | Age: 55
End: 2025-06-30
Payer: COMMERCIAL

## 2025-06-30 DIAGNOSIS — M65.4 DE QUERVAIN'S TENOSYNOVITIS, RIGHT: Primary | ICD-10-CM

## 2025-06-30 PROCEDURE — 99214 OFFICE O/P EST MOD 30 MIN: CPT | Mod: GC | Performed by: STUDENT IN AN ORGANIZED HEALTH CARE EDUCATION/TRAINING PROGRAM

## 2025-06-30 NOTE — NURSING NOTE
Teaching Flowsheet     Visit Type: In Clinic    Time Start: 0856  Time End: 0906  Total Time Spent: 10 min.    Surgeon: Dr Magen Styles  Location of Surgery (known or anticipated): [facility]   Type of Anesthesia: Local  Worker's Compensation Procedure: No    Pertinent Medical History: Depression  Were medical conditions reviewed and appropriate for location? Yes  BMI: Normal BMI (22.86)    Relevant Diagnosis: DeQuervain's tenosynovitis on right  Teaching Topic: Right DeQuervain's release    Person(s) involved in teaching:   Patient  : No.   Verified Patient's Phone Number: NO    Caregiver//  Name: N/A, local anesthetic only    Motivation Level:  Asks Questions: Yes  Eager to Learn: Yes  Cooperative: Yes  Receptive (willing/able to accept information): Yes  Any cultural factors/Moravian beliefs that may influence understanding or compliance? No     Patient demonstrates understanding of the following:  Reason for the appointment, diagnosis and treatment plan: Yes  Knowledge of proper use of medications and conditions for which they are ordered (with special attention to potential side effects or drug interactions): Yes  Which situations necessitate calling provider and whom to contact: Yes     Teaching Concerns Addressed:   Proper use and care of medical equip, care aids, etc.: Yes  Nutritional needs and diet plan: Yes  Pain management techniques: Yes  Wound Care: Yes  How and/when to access community resources: Yes  Need for pre-op with in 30 days: N/A     Does patient have difficulty getting a ride to appointments (post-ops, PT/OT): No  Patient's plan after discharge: home with family or spouse     Instructional Materials Used/Given: Preoperative surgery packet, 2 bottles of antibacterial Chlorhexidine soap. Stop Light Tool reviewed, after-hours number provided, patient verbalized understanding, had no immediate questions.    - Important Contact Info/Phone Numbers: emphasizing clinic number  [phone] and after hours number [phone]  - Map/location of surgery and follow-up appointments  - Showering instructions  - Stoplight Tool     -Next step: schedule a surgery date.    Jeannine Ashley RN

## 2025-06-30 NOTE — LETTER
6/30/2025      Jessica Sanches  [address] [place] [address]      Dear Colleague,    Thank you for referring your patient, Jessica Sanches, to the [facility] [place]. Please see a copy of my visit note below.    Orthopaedic Surgery Hand and Upper Extremity Clinic H&P Note:  Date: Jun 30, 2025     Patient Name: Jessica Sanches  MRN: [id number]    Consult requested by: Referred by Dr Bateman for ongoing care    CHIEF COMPLAINT: Right De Quervain's Tenosynovitis    Dominant Hand: Right  Occupation: Marketing      HPI:  Ms. Jessica Sanches is a 54 year old  right hand dominant female who presents in follow up for right De Quervain's tenosynovitis. The patient was last seen by Dr. Bateman on 5/21/2025 at which point discussion was had to continue with hand therapy with return to clinic in 5 to 6 weeks should she still have persistent symptoms and desire operative discussion.     On examination today, the patient states that she has continued with hand therapy which has helped to improve her symptoms, however, she still has residual discomfort as well as swelling and prominence over her right radial dorsal wrist.  She continues to have a brace at home which she feels is overall helpful for her symptoms.  She did undergo 1 prior corticosteroid injection in December 2024 with approximately 2 months of relief.  Given persistent symptoms and underlying discomfort, the patient would like to discuss surgical decompression.  No prior surgeries to her right upper extremity.  No underlying numbness or tingling.      PAST MEDICAL HISTORY:  Past Medical History:   Diagnosis Date     Amblyopia      Strabismus        PAST SURGICAL HISTORY:  Past Surgical History:   Procedure Laterality Date     LASIK       STRABISMUS SURGERY         MEDICATIONS:  Current Outpatient Medications   Medication Sig Dispense Refill     venlafaxine (EFFEXOR XR) 37.5 MG 24 hr capsule Take 37.5 mg by mouth  daily       No current facility-administered medications for this visit.       ALLERGIES:   No Known Allergies    FAMILY HISTORY:  No pertinent family history    SOCIAL HISTORY:  Social History     Tobacco Use     Smoking status: Never     Smokeless tobacco: Never     The patient's past medical, family, and social history was reviewed and confirmed.    EXAM:  General: NAD, A&Ox3  HEENT: NC/AT  CV: RRR by peripheral pulse  Pulmonary: Non-labored breathing on RA    Right Upper Extremity:  Inspection:  There is no evidence of open wounds.   There is no evidence of erythema or infection  Prominence on the dorsal radial wrist about the extensor retinaculum.  Minimal tenosynovitis with the first dorsal compartment  There is no appreciable intrinsic atrophy  There is well maintained thenar musculature    Palpation:  There is no palpable fluctuance  There is mild tenderness to palpation at first dorsal compartment  There is no tenderness to palpation at the base of the thumb.    Motor:  Intact EPL, FPL, IO    Sensory:  Intact to light touch in the median, radial, and ulnar nerve distributions    Equivocal Finkelstein       IMAGING:  MRI of the right wrist previously obtained on 4/30/2025 is personally reviewed.  This shows extensive tenosynovitis about the first dorsal compartment at the level of the radiocarpal joint.  There is fluid within the tendon sheath as well as intratendinous signal of the first dorsal compartment.  No appreciable full-thickness cartilage loss, no evidence of acute bony or ligamentous injury.    I have personally reviewed the above images and labs.     IMPRESSION AND RECOMMENDATIONS:  Ms. Jessica Sanches is a right-hand-dominant 54 year old female with mild right first extensor compartment tenosynovitis. I personally reviewed prior hand clinic notes and independently reviewed the above images studies.     I had a discussion with the patient regarding my clinical findings, diagnosis, and  treatment plan.  I reviewed the treatment options for de Quervain's tenosynovitis with the patient (observation, bracing, repeat steroid injection, occupational therapy, 1st dorsal compartment release) as well as the risks and benefits of each.  At this time, the patient continues to be symptomatic causing limitations to ADLs despite conservative measures. Therefore, I recommend operative intervention in the form of 1st dorsal compartment release.  The risks of surgery include but are not limited to infection, bleeding, nerve injury/permanent numbness, post-operative stiffness, recurrence, tendon subluxation, CRPS, anesthetic complications, etc.  We also discussed that there is a possibility that the surgery will not be successful and will require repeat surgery. We reviewed post-operative pain management and rehabilitation with splinting immediately post op and about 3-5 days out of work.  The patient fully understands and is agreeable to proceeding with the surgical procedure. All questions answered.        Plan for operative intervention in the form of right 1st dorsal compartment release under local anesthesia    The patient was seen and examined with Dr Styles.    Bertha Ordaz MD   Orthopaedic surgery, PGY4    ATTESTATION:  I saw and evaluated the patient and agree with the findings and plan of care as documented in the note.    Patient with persistent DeQuervain's tenosynovitis despite conservative treatment. While improving, she has residual symptoms and wishes to proceed with surgery. Case request placed for first dorsal compartment tendon sheath release under local anesthesia.  I did advise that the thickening of the retinaculum and prominence will take time to resolve postoperatively.  I did also advise that tenosynovitis may persist postoperatively which we will treat with a steroid injection at the 6-week postop mireya.  Patient voiced understanding and agreement.  All questions answered.      Magen Styles,  MD    Hand, Upper Extremity & Microvascular Surgery  Department of Orthopedic Surgery  [facility]         Again, thank you for allowing me to participate in the care of your patient.        Sincerely,        Magen Styles MD    Electronically signed

## 2025-06-30 NOTE — NURSING NOTE
Reason For Visit:   Chief Complaint   Patient presents with    RECHECK     Follow-up right 1st extensor compartment tenosynovitis       Primary MD: Raiza, Kymberly [place]  Ref. MD: Terrell    Age: 54 year old    ?  No      There were no vitals taken for this visit.      Pain Assessment  Patient Currently in Pain: Yes  Primary Pain Location: Wrist (Right)  Pain Descriptors: Sore, Constant    Hand Dominance Evaluation  Hand Dominance: Right          QuickDASH Assessment      6/29/2025    10:46 PM   QuickDASH Main   1. Open a tight or new jar Moderate difficulty   2. Do heavy household chores (e.g., wash walls, floors) Mild difficulty   3. Carry a shopping bag or briefcase No difficulty   4. Wash your back No difficulty   5. Use a knife to cut food Mild difficulty   6. Recreational activities in which you take some force or impact through your arm, shoulder or hand (e.g., golf, hammering, tennis, etc.) Moderate difficulty   7. During the past week, to what extent has your arm, shoulder or hand problem interfered with your normal social activities with family, friends, neighbours or groups Moderately   8. During the past week, were you limited in your work or other regular daily activities as a result of your arm, shoulder or hand problem Moderately limited   9. Arm, shoulder or hand pain Mild   10.Tingling (pins and needles) in your arm,shoulder or hand None   11. During the past week, how much difficulty have you had sleeping because of the pain in your arm, shoulder or hand No difficulty   Quickdash Ability Score 25          Current Outpatient Medications   Medication Sig Dispense Refill    venlafaxine (EFFEXOR XR) 37.5 MG 24 hr capsule Take 37.5 mg by mouth daily         No Known Allergies    Kelley Salmon, ATC

## 2025-06-30 NOTE — PROGRESS NOTES
Orthopaedic Surgery Hand and Upper Extremity Clinic H&P Note:  Date: Jun 30, 2025     Patient Name: Jessica Sanches  MRN: [id number]    Consult requested by: Referred by Dr Bateman for ongoing care    CHIEF COMPLAINT: Right De Quervain's Tenosynovitis    Dominant Hand: Right  Occupation: Marketing      HPI:  Ms. Jessica Sanches is a 54 year old  right hand dominant female who presents in follow up for right De Quervain's tenosynovitis. The patient was last seen by Dr. Bateman on 5/21/2025 at which point discussion was had to continue with hand therapy with return to clinic in 5 to 6 weeks should she still have persistent symptoms and desire operative discussion.     On examination today, the patient states that she has continued with hand therapy which has helped to improve her symptoms, however, she still has residual discomfort as well as swelling and prominence over her right radial dorsal wrist.  She continues to have a brace at home which she feels is overall helpful for her symptoms.  She did undergo 1 prior corticosteroid injection in December 2024 with approximately 2 months of relief.  Given persistent symptoms and underlying discomfort, the patient would like to discuss surgical decompression.  No prior surgeries to her right upper extremity.  No underlying numbness or tingling.      PAST MEDICAL HISTORY:  Past Medical History:   Diagnosis Date    Amblyopia     Strabismus        PAST SURGICAL HISTORY:  Past Surgical History:   Procedure Laterality Date    LASIK      STRABISMUS SURGERY         MEDICATIONS:  Current Outpatient Medications   Medication Sig Dispense Refill    venlafaxine (EFFEXOR XR) 37.5 MG 24 hr capsule Take 37.5 mg by mouth daily       No current facility-administered medications for this visit.       ALLERGIES:   No Known Allergies    FAMILY HISTORY:  No pertinent family history    SOCIAL HISTORY:  Social History     Tobacco Use    Smoking status: Never    Smokeless tobacco: Never      The patient's past medical, family, and social history was reviewed and confirmed.    EXAM:  General: NAD, A&Ox3  HEENT: NC/AT  CV: RRR by peripheral pulse  Pulmonary: Non-labored breathing on RA    Right Upper Extremity:  Inspection:  There is no evidence of open wounds.   There is no evidence of erythema or infection  Prominence on the dorsal radial wrist about the extensor retinaculum.  Minimal tenosynovitis with the first dorsal compartment  There is no appreciable intrinsic atrophy  There is well maintained thenar musculature    Palpation:  There is no palpable fluctuance  There is mild tenderness to palpation at first dorsal compartment  There is no tenderness to palpation at the base of the thumb.    Motor:  Intact EPL, FPL, IO    Sensory:  Intact to light touch in the median, radial, and ulnar nerve distributions    Equivocal Finkelstein       IMAGING:  MRI of the right wrist previously obtained on 4/30/2025 is personally reviewed.  This shows extensive tenosynovitis about the first dorsal compartment at the level of the radiocarpal joint.  There is fluid within the tendon sheath as well as intratendinous signal of the first dorsal compartment.  No appreciable full-thickness cartilage loss, no evidence of acute bony or ligamentous injury.    I have personally reviewed the above images and labs.     IMPRESSION AND RECOMMENDATIONS:  Ms. Jessica Sanches is a right-hand-dominant 54 year old female with mild right first extensor compartment tenosynovitis. I personally reviewed prior hand clinic notes and independently reviewed the above images studies.     I had a discussion with the patient regarding my clinical findings, diagnosis, and treatment plan.  I reviewed the treatment options for de Quervain's tenosynovitis with the patient (observation, bracing, repeat steroid injection, occupational therapy, 1st dorsal compartment release) as well as the risks and benefits of each.  At this time, the patient  continues to be symptomatic causing limitations to ADLs despite conservative measures. Therefore, I recommend operative intervention in the form of 1st dorsal compartment release.  The risks of surgery include but are not limited to infection, bleeding, nerve injury/permanent numbness, post-operative stiffness, recurrence, tendon subluxation, CRPS, anesthetic complications, etc.  We also discussed that there is a possibility that the surgery will not be successful and will require repeat surgery. We reviewed post-operative pain management and rehabilitation with splinting immediately post op and about 3-5 days out of work.  The patient fully understands and is agreeable to proceeding with the surgical procedure. All questions answered.       Plan for operative intervention in the form of right 1st dorsal compartment release under local anesthesia    The patient was seen and examined with Dr Styles.    Bertha Ordaz MD   Orthopaedic surgery, PGY4    ATTESTATION:  I saw and evaluated the patient and agree with the findings and plan of care as documented in the note.    Patient with persistent DeQuervain's tenosynovitis despite conservative treatment. While improving, she has residual symptoms and wishes to proceed with surgery. Case request placed for first dorsal compartment tendon sheath release under local anesthesia.  I did advise that the thickening of the retinaculum and prominence will take time to resolve postoperatively.  I did also advise that tenosynovitis may persist postoperatively which we will treat with a steroid injection at the 6-week postop mireya.  Patient voiced understanding and agreement.  All questions answered.      Magen Styles MD    Hand, Upper Extremity & Microvascular Surgery  Department of Orthopedic Surgery  [facility]

## 2025-07-18 ENCOUNTER — HOSPITAL ENCOUNTER (OUTPATIENT)
Facility: AMBULATORY SURGERY CENTER | Age: 55
Discharge: HOME | End: 2025-07-18
Attending: STUDENT IN AN ORGANIZED HEALTH CARE EDUCATION/TRAINING PROGRAM | Admitting: STUDENT IN AN ORGANIZED HEALTH CARE EDUCATION/TRAINING PROGRAM
Payer: COMMERCIAL

## 2025-07-18 VITALS
DIASTOLIC BLOOD PRESSURE: 77 MMHG | OXYGEN SATURATION: 98 % | RESPIRATION RATE: 16 BRPM | WEIGHT: 110 LBS | TEMPERATURE: 97.2 F | HEART RATE: 91 BPM | HEIGHT: 60 IN | BODY MASS INDEX: 21.6 KG/M2 | SYSTOLIC BLOOD PRESSURE: 112 MMHG

## 2025-07-18 DIAGNOSIS — M65.4 DE QUERVAIN'S TENOSYNOVITIS, RIGHT: Primary | ICD-10-CM

## 2025-07-18 RX ORDER — LIDOCAINE HYDROCHLORIDE AND EPINEPHRINE 10; 10 MG/ML; UG/ML
10 INJECTION, SOLUTION INFILTRATION; PERINEURAL ONCE
Status: DISCONTINUED | OUTPATIENT
Start: 2025-07-18 | End: 2025-07-19 | Stop reason: HOSPADM

## 2025-07-18 RX ORDER — MAGNESIUM HYDROXIDE 1200 MG/15ML
LIQUID ORAL PRN
Status: DISCONTINUED | OUTPATIENT
Start: 2025-07-18 | End: 2025-07-18 | Stop reason: HOSPADM

## 2025-07-18 RX ORDER — OXYCODONE HYDROCHLORIDE 5 MG/1
5 TABLET ORAL EVERY 6 HOURS PRN
Qty: 3 TABLET | Refills: 0 | Status: SHIPPED | OUTPATIENT
Start: 2025-07-18 | End: 2025-07-21

## 2025-07-18 RX ORDER — LIDOCAINE HYDROCHLORIDE AND EPINEPHRINE 10; 10 MG/ML; UG/ML
INJECTION, SOLUTION INFILTRATION; PERINEURAL PRN
Status: DISCONTINUED | OUTPATIENT
Start: 2025-07-18 | End: 2025-07-18 | Stop reason: HOSPADM

## 2025-07-18 NOTE — BRIEF OP NOTE
[facility]    Brief Operative Note    Pre-operative diagnosis: De Quervain's tenosynovitis, right [M65.4]  Post-operative diagnosis Same as pre-operative diagnosis    Procedure: RELEASE, HAND, FOR DEQUERVAIN'S TENOSYNOVITIS, Right - Wrist    Surgeon: Surgeons and Role:     * Magen Styles MD - Primary     * Bertha Ordaz MD - Resident - Assisting  Anesthesia: Local   Estimated Blood Loss: 5cc ebl    Drains: None  Specimens: * No specimens in log *  Findings:   Hypertrophic synovitis about the first dorsal compartment with hypertrophy of the overlying sheath and subsheath. No subluxation of the EPB, APL with active wrist range of motion .  Complications: None.    Postoperative plan:   - Soft dressings to remain in place x1 week until follow up   - Short course of oxycodone for postoperative pain control.  - Discharge to home from PACU once PACU discharge criteria met    Bertha Ordaz MD, PGY4

## 2025-07-18 NOTE — OP NOTE
OPERATIVE REPORT    PATIENT NAME: Jessica Sanches  MRN: [id number]    DATE: 7/18/2025    PREOPERATIVE DIAGNOSIS:   1. Right de Quervain's tenosynovitis.    POSTOPERATIVE DIAGNOSIS:   1. Right de Quervain's tenosynovitis.   OPERATION:     1. Right first dorsal compartment release with tenosynovectomy of APL and EPB.     SURGEON: Preston Styles MD    ASSISTANT(S): Bertha Ordaz MD, PGY4    ANESTHESIA: Local    ESTIMATED BLOOD LOSS: < 5 mL.    FLUIDS: See anesthesia records.     TOURNIQUET TIME: 15 minutes at 250 mmHg    COMPLICATION: None.     FINDINGS: Hypertrophic synovitis about the first dorsal compartment with hypertrophy of the overlying sheath and subsheath. No subluxation of the EPB, APL with active wrist range of motion .     INDICATIONS: Jessica Sanches is a 54 year old RHD female who developed a right de Quervain's tenosynovitis, which has been symptomatic and did not improve with conservative treatment.  After a thorough evaluation and discussion of treatment options, the patient was indicated for operative intervention.  The risks, benefits, and alternatives were discussed with the patient.  The patient verbalized understanding of the treatment plan and signed the consent.    DESCRIPTION OF PROCEDURE: The patient was taken to the operating room and placed in supine position on the operating table. The surgical site was anesthetized with ~ 10 mL of a 50:50 mixture of 1% lidocaine and 0.5% marcaine with epi.  A forearm tourniquet was applied to the right upper extremity, which was then prepped and draped in the usual sterile fashion.  A timeout was performed with all OR staff.  The patient name, MRN, operative extremity, procedure, allergies, antibiotics, DVT prophylaxis/SCDs, and fire precautions/plan were reviewed, and all were in agreement. The extremity was exsanguinated with an Esmarch bandage and the tourniquet was inflated to 250 mmHg.    A transverse incision was made at the level of the radial  styloid overlying the first dorsal compartment. Skin and subcutaneous tissues were sharply incised. Dissection was carried down to the first dorsal compartment sheath. The superficial radial nerve and its branches were identified and retracted/protected.The first dorsal compartment was then exposed and noted to be significantly thickened. The overlying sheath was longitudinally incised.  The EPB and APL were found to be in separate compartments.  A complete release of both sub-compartments was confirmed.  The central ridge of the compartment was excised as well as hypertrophic tenosynovium involving the EPB and APL tendons.  The wrist was taken through a full arc of motion without evidence of tendon subluxation. The wound was copiously irrigated. The tourniquet was let down. The deep dermal and subcuticular layers were closed with 3-0 monocryl and running 4-0 prolene, respectively.  Soft sterile dressings were applied.  Capillary refill was intact, < 2 seconds.     The patient was aroused from anesthesia and taken to the recovery room in stable condition.      All counts were correct at the end of the case. Dr Styles was scrubbed for the entirety of the case.    There were no complications.    Postoperative plan:   - Soft dressings to remain in place x1 week until follow up, anticipate suture removal at that time  - Short course of oxycodone for postoperative pain control.  - Discharge to home from PACU once PACU discharge criteria met     Bertha Ordaz MD  Orthopaedic surgery, PGY4

## 2025-07-18 NOTE — DISCHARGE INSTRUCTIONS
Procedure Performed: Right first dorsal compartment release  Attending Surgeon: Dr Styles  Date: 2025    DIAGNOSIS  1. De Quervain's tenosynovitis, right        MEDICATIONS   Resume all home medications as directed unless otherwise instructed during this hospitalization. If there is any question, double check with your primary care provider.  Start new discharge medications as directed.    Take 1 tablet of 650 mg Tylenol (acetaminophen) Arthritis Strength (extended release) and 1 tablet of Aleve (naproxen) 220 mg in the morning with breakfast and in the evening with dinner.     For breakthrough pain use narcotic pain medication as prescribed.    Do not drive or operate machinery while taking narcotic pain medications.   If you are taking other Tylenol containing medicines at home, be sure NOT to exceed 4 gram's (4000 milligrams) of Tylenol per day.   If you are taking pain medications, be sure to take Colace (docusate sodium) as well to prevent constipation. If constipated, try adding another cathartic or enema.  If nausea and vomiting, call the hospital or seek medical attention.    ACTIVITY   Weight bearin lb coffee cup weight bearing to operative extremity    DIET  Resume same diet prior to your hospital admission.    WOUND   Leave dressing on until you are seen in clinic for your follow up visit.   Watch for signs and symptoms of infection of your wounds including; pain, redness, swelling, drainage or fever.  If you notice any of these symptoms please call or seek medical attention.    Keep wound clean, dry, and intact.  Do not submerge wounds in water until they are healed. No baths, soaking, swimming, or prolonged water exposure for 4 weeks after surgery.    RETURN   Follow-up with Orthopedic Clinic as directed.     Future Appointments   Date Time Provider Department Center   2025  2:30 PM Edith Montague [facility]-C [facility]   2025  4:00 PM NYLA Barahona, OTR [facility]       Call the [facility] at [phone] during business hours for any symptoms such as:    * Fevers with Temperature greater than 101.5 degrees.   * Pus drainage from wound site.   * Severe pain, not controlled by medication.   * Persistent nausea, vomiting and inablility to tolerate fluids.    If you are receiving care in [place], you may call the Orthopedic clinic at [phone].    FOR URGENT PROBLEMS ONLY, after hours or on weekends call the hospital  at [phone] and ask to speak with the orthopedic resident on call.    [facility]  Home Care Following Your Procedure  Call a doctor if you have signs of infection (fever, growing tenderness at the surgery site, a large amount of drainage or bleeding, severe pain, foul-smelling drainage, redness, swelling).             Tylenol/Acetaminophen Consumption    If you feel your pain relief is insufficient, you may take Tylenol/Acetaminophen in addition to your narcotic pain medication.   Be careful not to exceed 4,000 mg of Tylenol/Acetaminophen in a 24 hour period from all sources.  If you are taking extra strength Tylenol/acetaminophen (500 mg), the maximum dose is 8 tablets in 24 hours.  If you are taking regular strength acetaminophen (325 mg), the maximum dose is 12 tablets in 24 hours.      Your doctor is:       Dr. Magen Styles, Orthopaedics: [phone]             Or dial [phone] and ask for the resident on call for:  Orthopaedics  For emergency care, call the:  [place]:  [phone] (TTY for hearing impaired: [phone])

## 2025-07-22 NOTE — PROGRESS NOTES
OCCUPATIONAL THERAPY EVALUATION  Type of Visit: Evaluation       Fall Risk Screen:       Subjective        Presenting condition or subjective complaint:    Date of onset: 07/18/25    Relevant medical history:     Past Medical History:   Diagnosis Date    Amblyopia     Strabismus      Dates & types of surgery:    Past Surgical History:   Procedure Laterality Date    LASIK      RELEASE DEQUERVAINS WRIST Right 7/18/2025    Procedure: RELEASE, HAND, FOR DEQUERVAIN'S TENOSYNOVITIS;  Surgeon: Magen Styles MD;  Location: [facility] OR    STRABISMUS SURGERY         Prior diagnostic imaging/testing results:       Prior therapy history for the same diagnosis, illness or injury:        Prior Level of Function  Transfers: Independent  Ambulation: Independent  ADL: Independent  IADL: IND    Living Environment  No LE concerns    Employment:    Yes; 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  Hobbies/Interests:      Patient goals for therapy:      Pain assessment: Pain present     Objective   ADDITIONAL HISTORY:  Right hand dominant  Patient reports symptoms of pain, stiffness/loss of motion, weakness/loss of strength, and edema  Transportation: drives  Currently working in normal job with restrictions        PAIN:  Pain Level at Rest: 2/10  Pain Location: wrist  Pain Quality: Aching  Pain Frequency: intermittent  Pain is Worst: daytime  Pain is Exacerbated By: post surgical  Pain is Relieved By: rest  Pain Progression: Improved    EDEMA:   Wrist/Elbow  (Circumference measured in cm) 7/23/2025   Distal Wrist Crease R:14.9 L:13.7        SCAR/WOUND: Steri strips in place clean dry intact over radial wrist    SENSATION: WNL throughout all nerve distributions; per patient report     ROM:   Wrist ROM  Left AROM Right AROM    Extension 60 61   Flexion 74 45   Radial Deviation (RD) 24 15   Ulnar Deviation (UD) 35 34   Supination 90 85   Pronation 90 90     Hand WNL     Thumb ROM  Left AROM Right AROM    MP Joint 54 50    IP Joint  65 75   Radial Abduction 40 29   Palmar Abduction 49 31+   Kapandji Opposition Scale (0-10/10) 10/10 10/10       RESISTED TESTING: Contraindicated     STRENGTH: Contraindicated           Assessment & Plan   CLINICAL IMPRESSIONS  Medical Diagnosis: R DQ Release    Treatment Diagnosis: R Wrist Pain    Impression/Assessment: Pt is a 54 year old female presenting to Occupational Therapy due to R DQ Release.  The following significant findings have been identified: Impaired ROM, Impaired strength, and Pain.  These identified deficits interfere with their ability to perform work tasks, recreational activities, and household chores as compared to previous level of function.     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, meal preparation and cleanup, and work  Clinical Decision Making (Complexity): Low complexity    PLAN OF CARE  Treatment Interventions:  Modalities:  US, Iontophoresis, Paraffin, TENS, and E-Stim  Therapeutic Exercise:  AROM, AAROM, PROM, Tendon Gliding, Blocking, Reverse Blocking, Place and Hold, Contract Relax, Extensor Tracking, Isotonics, Isometrics, and Stabilization  Neuromuscular re-education:  Nerve Gliding, Coordination/Dexterity, Sensory re-education, Desensitization, Kinesthetic Training, Proprioceptive Training, Posture, Kinesiotaping, Isometrics, and Stabilization  Manual Techniques:  Coordination/Dexterity, Joint mobilization, Scar mobilization, Friction massage, Myofascial release, and Manual edema mobilization  Orthotic Fabrication:  Static, Static progressive, and Dynamic  Self Care:  Self Care Tasks, Ergonomic Considerations, and Work Tasks    Long Term Goals   OT Goal 1  Goal Identifier: Meal Prep  Goal Description: Pt will open new or tight jar with a decrease in pain by 3 points.  Rationale: In order to maximize safety and independence with  performance of self-care activities  Goal Progress: Initial  Target Date: 09/23/25      Frequency of Treatment: 1x a week  Duration of Treatment: 8 weeks     Recommended Referrals to Other Professionals:   Education Assessment: Learner/Method: Patient;Demonstration;Pictures/Video;No Barriers to Learning     Risks and benefits of evaluation/treatment have been explained.   Patient/Family/caregiver agrees with Plan of Care.     Evaluation Time:    OT Buster Low Complexity Minutes (22800): 10       Signing Clinician: RON York

## 2025-07-23 ENCOUNTER — OFFICE VISIT (OUTPATIENT)
Dept: ORTHOPEDICS | Facility: CLINIC | Age: 55
End: 2025-07-23
Payer: COMMERCIAL

## 2025-07-23 ENCOUNTER — THERAPY VISIT (OUTPATIENT)
Dept: OCCUPATIONAL THERAPY | Facility: CLINIC | Age: 55
End: 2025-07-23
Payer: COMMERCIAL

## 2025-07-23 DIAGNOSIS — M65.4 DE QUERVAIN'S TENOSYNOVITIS, RIGHT: Primary | ICD-10-CM

## 2025-07-23 DIAGNOSIS — Z98.890 POST-OPERATIVE STATE: ICD-10-CM

## 2025-07-23 DIAGNOSIS — M25.531 RIGHT WRIST PAIN: Primary | ICD-10-CM

## 2025-07-23 PROCEDURE — 97165 OT EVAL LOW COMPLEX 30 MIN: CPT | Mod: GO | Performed by: OCCUPATIONAL THERAPIST

## 2025-07-23 PROCEDURE — 97110 THERAPEUTIC EXERCISES: CPT | Mod: GO | Performed by: OCCUPATIONAL THERAPIST

## 2025-07-23 PROCEDURE — 99024 POSTOP FOLLOW-UP VISIT: CPT | Performed by: PHYSICIAN ASSISTANT

## 2025-07-23 NOTE — LETTER
7/23/2025      Jessica Sanches  [address] [place] [address]      Dear Colleague,    Thank you for referring your patient, Jessica Sanches, to the [facility] [place]. Please see a copy of my visit note below.    Date of Service: Jul 23, 2025    Chief Complaint: Post operative follow up.     Date of Surgery: 7/18/25    Procedure Performed: Right first dorsal compartment release with tenosynovectomy of APL and EPB.      Surgeon: Dr. Magen Styles    Interval events: Jessica Sanches is a 54 year old female who presents today for a postoperative follow up. She is doing well. Pain well controlled. No concerns today.     The past medical history was reviewed updated in the EMR. This includes medications, surgeries, social history, and review of systems.    Physical examination:  Well-developed, well-nourished and in no acute distress.  Alert and oriented to surroundings.  On examination of the  right wrist, incision is well-healed. There is no erythema, drainage, or dehiscence. Swelling is Mild. Sensation is intact in median, radial and ulnar nerve distributions. Patient can actively flex and extend all digits and thumb. The patient is able to make a full composite fist. Fingers are warm and well-perfused. Radial pulse is palpable.     Assessment: 54 year old female s/p Right first dorsal compartment release with tenosynovectomy of APL and EPB, progressing appropriately.     Plan:  Patient can shower and get the incision wet. No submerging in any standing water until the wound is fully healed. May wear thumb spica orthosis as needed for comfort, wean out as tolerated. We did discuss corticosteroid injection if pain persists at 6 weeks postoperatively. If doing well May follow up as needed.     EDITH MONTAGUE PA-C  Orthopaedic Surgery     Again, thank you for allowing me to participate in the care of your patient.        Sincerely,        Edith Montague,  MAL    Electronically signed

## 2025-07-23 NOTE — NURSING NOTE
Reason For Visit:   Chief Complaint   Patient presents with    Surgical Followup     POST OP Right first dorsal compartment release with tenosynovectomy of APL and EPB.   DOS: 7/18/25       Primary MD: Kymberly Eastman  Ref. MD: Terrell    Age: 54 year old    ?  No      There were no vitals taken for this visit.      Pain Assessment  Patient Currently in Pain: Yes  Patient's Stated Pain Goal: 2  Primary Pain Location: Wrist (Right)  Pain Descriptors: Intermittent, Constant, Sore  Alleviating Factors: NSAIDS    Hand Dominance Evaluation  Hand Dominance: Right          QuickDASH Assessment      6/29/2025    10:46 PM   QuickDASH Main   1. Open a tight or new jar Moderate difficulty   2. Do heavy household chores (e.g., wash walls, floors) Mild difficulty   3. Carry a shopping bag or briefcase No difficulty   4. Wash your back No difficulty   5. Use a knife to cut food Mild difficulty   6. Recreational activities in which you take some force or impact through your arm, shoulder or hand (e.g., golf, hammering, tennis, etc.) Moderate difficulty   7. During the past week, to what extent has your arm, shoulder or hand problem interfered with your normal social activities with family, friends, neighbours or groups Moderately   8. During the past week, were you limited in your work or other regular daily activities as a result of your arm, shoulder or hand problem Moderately limited   9. Arm, shoulder or hand pain Mild   10.Tingling (pins and needles) in your arm,shoulder or hand None   11. During the past week, how much difficulty have you had sleeping because of the pain in your arm, shoulder or hand No difficulty   Quickdash Ability Score 25          Current Outpatient Medications   Medication Sig Dispense Refill    venlafaxine (EFFEXOR XR) 37.5 MG 24 hr capsule Take 37.5 mg by mouth daily         No Known Allergies    CHELSI CHING, ATC

## 2025-07-23 NOTE — PROGRESS NOTES
Date of Service: Jul 23, 2025    Chief Complaint: Post operative follow up.     Date of Surgery: 7/18/25    Procedure Performed: Right first dorsal compartment release with tenosynovectomy of APL and EPB.      Surgeon: Dr. Magen Styles    Interval events: Jessica Sanches is a 54 year old female who presents today for a postoperative follow up. She is doing well. Pain well controlled. Some increased soreness at the end of her workday yesterday. No concerns today.     The past medical history was reviewed updated in the EMR. This includes medications, surgeries, social history, and review of systems.    Physical examination:  Well-developed, well-nourished and in no acute distress.  Alert and oriented to surroundings.  On examination of the  right wrist, incision is well-healed. There is no erythema, drainage, or dehiscence. Swelling is Mild. Sensation is intact in median, radial and ulnar nerve distributions. Patient can actively flex and extend all digits and thumb. The patient is able to make a full composite fist. Fingers are warm and well-perfused. Radial pulse is palpable.     Assessment: 54 year old female s/p Right first dorsal compartment release with tenosynovectomy of APL and EPB, progressing appropriately.     Plan:  Patient can shower and get the incision wet. No submerging in any standing water until the wound is fully healed. May wear thumb spica orthosis as needed for comfort, wean out as tolerated. Recommend ice and antiinflammatories. We did discuss corticosteroid injection if pain persists at 6 weeks postoperatively. If doing well may follow up as needed.     TAMARA WATSON PA-C  Orthopaedic Surgery

## 2025-08-11 ENCOUNTER — THERAPY VISIT (OUTPATIENT)
Dept: OCCUPATIONAL THERAPY | Facility: CLINIC | Age: 55
End: 2025-08-11
Payer: COMMERCIAL

## 2025-08-11 DIAGNOSIS — M25.531 RIGHT WRIST PAIN: Primary | ICD-10-CM

## 2025-08-11 DIAGNOSIS — M65.4 DE QUERVAIN'S TENOSYNOVITIS, RIGHT: ICD-10-CM

## 2025-08-11 PROCEDURE — 97535 SELF CARE MNGMENT TRAINING: CPT | Mod: GO

## 2025-08-11 PROCEDURE — 97110 THERAPEUTIC EXERCISES: CPT | Mod: GO

## 2025-09-02 ENCOUNTER — OFFICE VISIT (OUTPATIENT)
Dept: FAMILY MEDICINE | Facility: CLINIC | Age: 55
End: 2025-09-02
Payer: COMMERCIAL

## 2025-09-02 VITALS
RESPIRATION RATE: 16 BRPM | HEART RATE: 109 BPM | WEIGHT: 113.5 LBS | OXYGEN SATURATION: 98 % | SYSTOLIC BLOOD PRESSURE: 132 MMHG | TEMPERATURE: 98.1 F | HEIGHT: 60 IN | DIASTOLIC BLOOD PRESSURE: 73 MMHG | BODY MASS INDEX: 22.28 KG/M2

## 2025-09-02 DIAGNOSIS — F41.1 GAD (GENERALIZED ANXIETY DISORDER): ICD-10-CM

## 2025-09-02 DIAGNOSIS — F33.1 MODERATE RECURRENT MAJOR DEPRESSION (H): Primary | ICD-10-CM

## 2025-09-02 RX ORDER — BUPROPION HYDROCHLORIDE 150 MG/1
150 TABLET ORAL EVERY MORNING
Qty: 90 TABLET | Refills: 1 | Status: SHIPPED | OUTPATIENT
Start: 2025-09-02

## 2025-09-02 RX ORDER — ESCITALOPRAM OXALATE 10 MG/1
10 TABLET ORAL DAILY
Qty: 90 TABLET | Refills: 1 | Status: SHIPPED | OUTPATIENT
Start: 2025-09-02

## 2025-09-02 ASSESSMENT — ANXIETY QUESTIONNAIRES
GAD7 TOTAL SCORE: 13
IF YOU CHECKED OFF ANY PROBLEMS ON THIS QUESTIONNAIRE, HOW DIFFICULT HAVE THESE PROBLEMS MADE IT FOR YOU TO DO YOUR WORK, TAKE CARE OF THINGS AT HOME, OR GET ALONG WITH OTHER PEOPLE: SOMEWHAT DIFFICULT
7. FEELING AFRAID AS IF SOMETHING AWFUL MIGHT HAPPEN: SEVERAL DAYS
1. FEELING NERVOUS, ANXIOUS, OR ON EDGE: NEARLY EVERY DAY
2. NOT BEING ABLE TO STOP OR CONTROL WORRYING: MORE THAN HALF THE DAYS
GAD7 TOTAL SCORE: 13
5. BEING SO RESTLESS THAT IT IS HARD TO SIT STILL: NOT AT ALL
4. TROUBLE RELAXING: MORE THAN HALF THE DAYS
GAD7 TOTAL SCORE: 13
8. IF YOU CHECKED OFF ANY PROBLEMS, HOW DIFFICULT HAVE THESE MADE IT FOR YOU TO DO YOUR WORK, TAKE CARE OF THINGS AT HOME, OR GET ALONG WITH OTHER PEOPLE?: SOMEWHAT DIFFICULT
7. FEELING AFRAID AS IF SOMETHING AWFUL MIGHT HAPPEN: SEVERAL DAYS
3. WORRYING TOO MUCH ABOUT DIFFERENT THINGS: MORE THAN HALF THE DAYS
6. BECOMING EASILY ANNOYED OR IRRITABLE: NEARLY EVERY DAY

## 2025-09-02 ASSESSMENT — PATIENT HEALTH QUESTIONNAIRE - PHQ9
10. IF YOU CHECKED OFF ANY PROBLEMS, HOW DIFFICULT HAVE THESE PROBLEMS MADE IT FOR YOU TO DO YOUR WORK, TAKE CARE OF THINGS AT HOME, OR GET ALONG WITH OTHER PEOPLE: VERY DIFFICULT
SUM OF ALL RESPONSES TO PHQ QUESTIONS 1-9: 13
SUM OF ALL RESPONSES TO PHQ QUESTIONS 1-9: 13

## 2025-09-02 ASSESSMENT — PAIN SCALES - GENERAL: PAINLEVEL_OUTOF10: NO PAIN (0)

## 2025-09-03 ENCOUNTER — THERAPY VISIT (OUTPATIENT)
Dept: OCCUPATIONAL THERAPY | Facility: CLINIC | Age: 55
End: 2025-09-03
Payer: COMMERCIAL

## 2025-09-03 DIAGNOSIS — M65.4 DE QUERVAIN'S TENOSYNOVITIS, RIGHT: Primary | ICD-10-CM

## 2025-09-03 PROCEDURE — 97535 SELF CARE MNGMENT TRAINING: CPT | Mod: GO

## 2025-09-03 PROCEDURE — 97110 THERAPEUTIC EXERCISES: CPT | Mod: GO

## (undated) DEVICE — PREP CHLORAPREP 26ML TINTED HI-LITE ORANGE 930815

## (undated) DEVICE — DRSG GAUZE 4X4" 3033

## (undated) DEVICE — SOL NACL 0.9% IRRIG 500ML BOTTLE 2F7123

## (undated) DEVICE — CAST PADDING 2" STERILE 9062S

## (undated) DEVICE — PACK MINOR HAND CUSTOM ASC 37-0097A

## (undated) DEVICE — BNDG ELASTIC 2"X5YDS UNSTERILE 6611-20

## (undated) DEVICE — LINEN ORTHO PACK 5446

## (undated) DEVICE — Device

## (undated) DEVICE — DRSG STERI STRIP 1X5" R1548

## (undated) DEVICE — SU MONOCRYL 4-0 PS-2 27" UND Y426H